# Patient Record
Sex: FEMALE | Race: WHITE | NOT HISPANIC OR LATINO | Employment: FULL TIME | URBAN - METROPOLITAN AREA
[De-identification: names, ages, dates, MRNs, and addresses within clinical notes are randomized per-mention and may not be internally consistent; named-entity substitution may affect disease eponyms.]

---

## 2022-08-26 ENCOUNTER — TELEMEDICINE (OUTPATIENT)
Dept: FAMILY MEDICINE CLINIC | Facility: CLINIC | Age: 42
End: 2022-08-26
Payer: COMMERCIAL

## 2022-08-26 VITALS — WEIGHT: 140 LBS | HEIGHT: 66 IN | BODY MASS INDEX: 22.5 KG/M2

## 2022-08-26 DIAGNOSIS — U07.1 COVID-19 VIRUS INFECTION: Primary | ICD-10-CM

## 2022-08-26 PROCEDURE — 99202 OFFICE O/P NEW SF 15 MIN: CPT | Performed by: NURSE PRACTITIONER

## 2022-08-26 NOTE — PROGRESS NOTES
Virtual Regular Visit    Verification of patient location:    Patient is located in the following state in which I hold an active license [place]      Assessment/Plan:    Patient's primary concern today involves acquiring a letter for work so she can return on Monday  Due to patient's overall improvement and limited past medical history, she is not a candidate for oral antiviral COVID medications  Encouraged patient to continue with conservative treatment measures such as NSAIDs, warm fluids, and rest  Work note left at the     Problem List Items Addressed This Visit    None     Visit Diagnoses     COVID-19 virus infection    -  Primary               Reason for visit is   Chief Complaint   Patient presents with    COVID-19     Started Tuesday, really bad headache, stuffy nose, sore throat, fever        Encounter provider FILEMON Padgett    Provider located at [address] [address]      Recent Visits  No visits were found meeting these conditions  Showing recent visits within past 7 days and meeting all other requirements  Today's Visits  Date Type Provider Dept   08/26/22 Telemedicine Osvaldo Salinas, [address]  today's visits and meeting all other requirements  Future Appointments  No visits were found meeting these conditions  Showing future appointments within next 150 days and meeting all other requirements       The patient was identified by name and date of birth  Janella Litten was informed that this is a telemedicine visit and that the visit is being conducted through [facility] and patient was informed this is a secure, HIPAA-complaint platform  She agrees to proceed     My office door was closed  No one else was in the room    She acknowledged consent and understanding of privacy and security of the video platform  The patient has agreed to participate and understands they can discontinue the visit at any time  Patient is aware this is a billable service  Morro Muller is a 35 y o  female with a positive COVID test        Minor s/s Tuesday (throat tickle), trouble sleeping that night with body aches  Horrible feeling Wednesday, positive home covid test   Symptoms on Wednesday included cough, body aches, fatigue, and fever  Patient denies any shortness of breath or respiratory distress  Patient is in overall good health with limited past medical history  Patient reports every day I feel significantly better  Patient's activity level is returning to normal and is maintaining adequate hydration         History reviewed  No pertinent past medical history  History reviewed  No pertinent surgical history  No current outpatient medications on file  No current facility-administered medications for this visit  No Known Allergies    Review of Systems   Constitutional: Positive for appetite change  Negative for chills and fever  HENT: Positive for congestion and sore throat  Eyes: Negative  Respiratory: Positive for cough  Negative for shortness of breath and wheezing  Cardiovascular: Negative  Gastrointestinal: Negative  Negative for constipation, diarrhea, nausea and vomiting  Endocrine: Negative  Genitourinary: Negative  Musculoskeletal: Negative  Negative for arthralgias and myalgias  Skin: Negative  Allergic/Immunologic: Negative  Neurological: Positive for headaches  Hematological: Negative  Psychiatric/Behavioral: Negative  Video Exam    Vitals:    08/26/22 1127   Weight: 63 5 kg (140 lb)   Height: 5' 6" (1 676 m)       Physical Exam  Constitutional:       General: She is not in acute distress  Appearance: Normal appearance  She is not toxic-appearing  HENT:      Head: Normocephalic and atraumatic        Nose: Nose normal    Eyes: Extraocular Movements: Extraocular movements intact  Conjunctiva/sclera: Conjunctivae normal    Pulmonary:      Effort: Pulmonary effort is normal  No respiratory distress  Breath sounds: No stridor  No wheezing or rhonchi  Musculoskeletal:         General: Normal range of motion  Cervical back: Normal range of motion  Skin:     Coloration: Skin is not jaundiced or pale  Findings: No bruising, erythema, lesion or rash  Neurological:      Mental Status: She is alert and oriented to person, place, and time     Psychiatric:         Mood and Affect: Mood normal          Behavior: Behavior normal           I spent 20 minutes directly with the patient during this visit

## 2022-08-26 NOTE — LETTER
August 26, 2022     Patient: Kayla Trujillo  YOB: 1980  Date of Visit: 8/26/2022      To Whom it May Concern:    Kayla Trujillo is under my professional care  Daniele Mera was seen in my office on 8/26/2022  Daniele Samaria may return to work on Monday 8/29/22  If you have any questions or concerns, please don't hesitate to call           Sincerely,          FILEMON Marsh        CC: No Recipients English